# Patient Record
(demographics unavailable — no encounter records)

---

## 2024-11-13 NOTE — HISTORY OF PRESENT ILLNESS
[FreeTextEntry1] : Ronal is a 70 year old male with Afib (on Xarelto), HTN, DM II, and schizophrenia presenting to \[facility]\"" care for seizures.  Admitted to [facility] 10/21 - 10/24 following an unwitnessed syncopal event. Found to have a left IT fracture requiring surgery. vEEG negative during his stay. Cardiology consulted - TTE unremarkable. 24h tele normal. Found to have orthostatic hypotension, possibly causing the fall. Discharged to [facility].   History of seizures on Lamotrigine 200mg BID.  Taking Olanzapine 5mg for schizophrenia.

## 2025-01-05 NOTE — END OF VISIT
[FreeTextEntry3] : Documented by Karey Vivar acting as a scribe for Dr. Lonny Maher. 12/31/2024  All medical record entries made by the Scribe were at my, Dr. Lonny Maher, direction and personally dictated by me on 12/31/2024. I have reviewed the chart and agree that the record accurately reflects my personal performance of the history, physical exam, assessment and plan. I have also personally directed, reviewed, and agreed with the chart.

## 2025-01-05 NOTE — DISCUSSION/SUMMARY
[de-identified] : IMP:70-year-old male now, about eight weeks status post, left hip cephalomedullary nailing, clinically doing well at this time. - I provided him with a referral to outpatient physical therapy. - I do think that it would be reasonable for him to be discharged from his [facility] at this time, and I indicated so on his visit paperwork today. - He will follow up with Dr. Schmid

## 2025-01-05 NOTE — DISCUSSION/SUMMARY
[de-identified] : IMP:70-year-old male now, about eight weeks status post, left hip cephalomedullary nailing, clinically doing well at this time. - I provided him with a referral to outpatient physical therapy. - I do think that it would be reasonable for him to be discharged from his [facility] at this time, and I indicated so on his visit paperwork today. - He will follow up with Dr. Schmid

## 2025-01-05 NOTE — PHYSICAL EXAM
[de-identified] : General appearance: well nourished and hydrated, pleasant, alert and oriented x 3, cooperative.   HEENT: normocephalic, EOM intact, wearing mask, external auditory canal clear.   Cardiovascular: no lower leg edema, no varicosities, dorsalis pedis pulses palpable and symmetric.   Lymphatics: no palpable lymphadenopathy, no lymphedema.   Neurologic: sensation is normal, no muscle weakness in upper or lower extremities, patella tendon reflexes present and symmetric.   Dermatologic: skin moist, warm, no rash.   Spine: cervical spine with normal lordosis and painless range of motion, thoracic spine with normal kyphosis and painless range of motion, lumbosacral spine with normal lordosis and painless range of motion.  No tenderness to palpation along midline spine and paraspinal musculature.  Sacroiliac joints nontender bilaterally. Negative SLR and crossed SLR tests bilaterally. Gait: He presents today in a wheelchair, he was able to stand unassisted and ambulate with no other assistive devices; demonstrating a left-sided limp and caution.  Limb lengths: clinically equal  Left hip: - Focal soft tissue swelling: none - Ecchymosis: none - Erythema: none - Wounds: well-healed cephalomedullary nailing incisions staples today were removed by me. - Tenderness: none - ROM:    - Flexion: 90   - Extension: 0   - Adduction: 10   - Abduction: 30   - Internal rotation in 90 degrees of hip flexion: 0   - External rotation in 90 degrees of hip flexion: 25 - LORETTA: stiff, but painless. - FADIR:stiff, but painless. - Constantin: negative - Stinchfield: negative - Flexor power: 4/5 - Abductor power: 1/5   [de-identified] : A lateral view of the lumbosacral spine, weightbearing AP pelvis, and 2 additional views (frog lateral and false profile) of the (left/right) hip were interpreted by me and reviewed with the patient.  Location of imaging:  [facility] Date of exam: 12/31/24  Lumbar spine -- Alignment: demonstrates some loss of normal lordosis.  Spondylosis: mild to multilevel spondylosis Listhesis: none Posterior elements:  demonstrate mild to moderate multilevel facet arthrosis.  Pelvic alignment: relatively outlet and oblique with the left side up. Right hip -- Arthritis: minimal osteoarthritis. TONNIS 1. Deformity: none Osteonecrosis: none  Left hip -- Arthritis: minimal osteoarthritis. TONNIS 1. Deformity: none Osteonecrosis: none - There's evidence of a recent long Cephalomedullary nailing. All hardware appears to be in reasonable position without evidence of breakage or hardware cutout.The lesser trochanter is displaced in a proximal and medial direction. There appears to be early callus formation. Fracture alignment appears to be reasonable. Skin staples appear to still be in place. It appears that the distal aspect of the nail was left unlocked.

## 2025-01-05 NOTE — HISTORY OF PRESENT ILLNESS
[Pain Location] : pain [] : right & left hip [0] : a current pain level of 0/10 [None] : No relieving factors are noted [de-identified] : 12/31/24: 70-year-old male presenting for post-surgical follow-up of left hip cephalomedullary nailing performed by Dr. Schmid on October 22, 2024. He's been  recovering at [facility], which he's very unhappy with at this time, citing issues with cleanliness and with security.  He would be interested in getting out of [facility] as quickly as possible. He reports that his post-surgical recovery is going well. He denies hip pain though he does  report a limp. He denies issues with wound drainage or systemic symptoms suggestive of infection patient presents today with a fully catheter attached to a leg  bag

## 2025-01-05 NOTE — PHYSICAL EXAM
[de-identified] : General appearance: well nourished and hydrated, pleasant, alert and oriented x 3, cooperative.   HEENT: normocephalic, EOM intact, wearing mask, external auditory canal clear.   Cardiovascular: no lower leg edema, no varicosities, dorsalis pedis pulses palpable and symmetric.   Lymphatics: no palpable lymphadenopathy, no lymphedema.   Neurologic: sensation is normal, no muscle weakness in upper or lower extremities, patella tendon reflexes present and symmetric.   Dermatologic: skin moist, warm, no rash.   Spine: cervical spine with normal lordosis and painless range of motion, thoracic spine with normal kyphosis and painless range of motion, lumbosacral spine with normal lordosis and painless range of motion.  No tenderness to palpation along midline spine and paraspinal musculature.  Sacroiliac joints nontender bilaterally. Negative SLR and crossed SLR tests bilaterally. Gait: He presents today in a wheelchair, he was able to stand unassisted and ambulate with no other assistive devices; demonstrating a left-sided limp and caution.  Limb lengths: clinically equal  Left hip: - Focal soft tissue swelling: none - Ecchymosis: none - Erythema: none - Wounds: well-healed cephalomedullary nailing incisions staples today were removed by me. - Tenderness: none - ROM:    - Flexion: 90   - Extension: 0   - Adduction: 10   - Abduction: 30   - Internal rotation in 90 degrees of hip flexion: 0   - External rotation in 90 degrees of hip flexion: 25 - LORETTA: stiff, but painless. - FADIR:stiff, but painless. - Constantin: negative - Stinchfield: negative - Flexor power: 4/5 - Abductor power: 1/5   [de-identified] : A lateral view of the lumbosacral spine, weightbearing AP pelvis, and 2 additional views (frog lateral and false profile) of the (left/right) hip were interpreted by me and reviewed with the patient.  Location of imaging:  [facility] Date of exam: 12/31/24  Lumbar spine -- Alignment: demonstrates some loss of normal lordosis.  Spondylosis: mild to multilevel spondylosis Listhesis: none Posterior elements:  demonstrate mild to moderate multilevel facet arthrosis.  Pelvic alignment: relatively outlet and oblique with the left side up. Right hip -- Arthritis: minimal osteoarthritis. TONNIS 1. Deformity: none Osteonecrosis: none  Left hip -- Arthritis: minimal osteoarthritis. TONNIS 1. Deformity: none Osteonecrosis: none - There's evidence of a recent long Cephalomedullary nailing. All hardware appears to be in reasonable position without evidence of breakage or hardware cutout.The lesser trochanter is displaced in a proximal and medial direction. There appears to be early callus formation. Fracture alignment appears to be reasonable. Skin staples appear to still be in place. It appears that the distal aspect of the nail was left unlocked.

## 2025-01-05 NOTE — HISTORY OF PRESENT ILLNESS
[Pain Location] : pain [] : right & left hip [0] : a current pain level of 0/10 [None] : No relieving factors are noted [de-identified] : 12/31/24: 70-year-old male presenting for post-surgical follow-up of left hip cephalomedullary nailing performed by Dr. Schmid on October 22, 2024. He's been  recovering at [facility], which he's very unhappy with at this time, citing issues with cleanliness and with security.  He would be interested in getting out of [facility] as quickly as possible. He reports that his post-surgical recovery is going well. He denies hip pain though he does  report a limp. He denies issues with wound drainage or systemic symptoms suggestive of infection patient presents today with a fully catheter attached to a leg  bag